# Patient Record
Sex: MALE | Race: WHITE
[De-identification: names, ages, dates, MRNs, and addresses within clinical notes are randomized per-mention and may not be internally consistent; named-entity substitution may affect disease eponyms.]

---

## 2020-05-26 ENCOUNTER — HOSPITAL ENCOUNTER (OUTPATIENT)
Dept: HOSPITAL 76 - DI | Age: 65
Discharge: HOME | End: 2020-05-26
Attending: NURSE PRACTITIONER
Payer: MEDICARE

## 2020-05-26 DIAGNOSIS — K42.9: Primary | ICD-10-CM

## 2020-05-26 PROCEDURE — 76705 ECHO EXAM OF ABDOMEN: CPT

## 2020-05-27 NOTE — ULTRASOUND REPORT
Reason:  UMBILICAL HERNIA

Procedure Date:  05/26/2020   

Accession Number:  [id number] / [id number]                    

Procedure:  US  - Abdomen Limited CPT Code:  

 

***Final Report***

 

 

FULL RESULT:

 

 

EXAM:

ABDOMEN ULTRASOUND LIMITED

 

EXAM DATE: 05/26/2020 05:46 PM.

 

CLINICAL HISTORY: Umbilical hernia.

 

COMPARISON: None available.

 

TECHNIQUE: Real-time scanning was performed with static images obtained.

 

FINDINGS:

Positive for an umbilical hernia which increases with Valsalva and does 

not completely reduce. The hernia measures up to approximately 2.9 x 1.9 

x 3 cm with neck measuring approximately 2.4 cm. The hernia contains fat, 

but no bowel is demonstrated.

IMPRESSION:

Positive for a fat-containing umbilical hernia which does not completely 

reduce.

 

[facility]

## 2021-02-18 ENCOUNTER — HOSPITAL ENCOUNTER (OUTPATIENT)
Dept: HOSPITAL 76 - LAB.S | Age: 66
Discharge: HOME | End: 2021-02-18
Attending: NURSE PRACTITIONER
Payer: MEDICARE

## 2021-02-18 DIAGNOSIS — E03.9: ICD-10-CM

## 2021-02-18 DIAGNOSIS — C61: Primary | ICD-10-CM

## 2021-02-18 LAB
ALBUMIN DIAFP-MCNC: 4.6 G/DL (ref 3.2–5.5)
ALBUMIN/GLOB SERPL: 1.3 {RATIO} (ref 1–2.2)
ALP SERPL-CCNC: 38 IU/L (ref 42–121)
ALT SERPL W P-5'-P-CCNC: 34 IU/L (ref 10–60)
ANION GAP SERPL CALCULATED.4IONS-SCNC: 9 MMOL/L (ref 6–13)
AST SERPL W P-5'-P-CCNC: 18 IU/L (ref 10–42)
BASOPHILS NFR BLD AUTO: 0.1 10^3/UL (ref 0–0.1)
BASOPHILS NFR BLD AUTO: 0.8 %
BILIRUB BLD-MCNC: 0.6 MG/DL (ref 0.2–1)
BUN SERPL-MCNC: 21 MG/DL (ref 6–20)
CALCIUM UR-MCNC: 9.5 MG/DL (ref 8.5–10.3)
CHLORIDE SERPL-SCNC: 101 MMOL/L (ref 101–111)
CO2 SERPL-SCNC: 26 MMOL/L (ref 21–32)
CREAT SERPLBLD-SCNC: 0.9 MG/DL (ref 0.6–1.2)
EOSINOPHIL # BLD AUTO: 0.4 10^3/UL (ref 0–0.7)
EOSINOPHIL NFR BLD AUTO: 4.5 %
ERYTHROCYTE [DISTWIDTH] IN BLOOD BY AUTOMATED COUNT: 13 % (ref 12–15)
GLOBULIN SER-MCNC: 3.5 G/DL (ref 2.1–4.2)
GLUCOSE SERPL-MCNC: 104 MG/DL (ref 70–100)
HGB UR QL STRIP: 14.2 G/DL (ref 14–18)
LYMPHOCYTES # SPEC AUTO: 2.2 10^3/UL (ref 1.5–3.5)
LYMPHOCYTES NFR BLD AUTO: 27.8 %
MCH RBC QN AUTO: 28.8 PG (ref 27–31)
MCHC RBC AUTO-ENTMCNC: 32.5 G/DL (ref 32–36)
MCV RBC AUTO: 88.6 FL (ref 80–94)
MONOCYTES # BLD AUTO: 0.7 10^3/UL (ref 0–1)
MONOCYTES NFR BLD AUTO: 9.4 %
NEUTROPHILS # BLD AUTO: 4.4 10^3/UL (ref 1.5–6.6)
NEUTROPHILS # SNV AUTO: 7.7 X10^3/UL (ref 4.8–10.8)
NEUTROPHILS NFR BLD AUTO: 57.1 %
PDW BLD AUTO: 8.6 FL (ref 7.4–11.4)
PLATELET # BLD: 274 10^3/UL (ref 130–450)
PROT SPEC-MCNC: 8.1 G/DL (ref 6.7–8.2)
RBC MAR: 4.93 10^6/UL (ref 4.7–6.1)

## 2021-02-18 PROCEDURE — 36415 COLL VENOUS BLD VENIPUNCTURE: CPT

## 2021-02-18 PROCEDURE — 84443 ASSAY THYROID STIM HORMONE: CPT

## 2021-02-18 PROCEDURE — 80053 COMPREHEN METABOLIC PANEL: CPT

## 2021-02-18 PROCEDURE — 85025 COMPLETE CBC W/AUTO DIFF WBC: CPT

## 2021-12-21 ENCOUNTER — HOSPITAL ENCOUNTER (OUTPATIENT)
Dept: HOSPITAL 76 - SC | Age: 66
Discharge: HOME | End: 2021-12-21
Attending: NURSE PRACTITIONER
Payer: MEDICARE

## 2021-12-21 VITALS — DIASTOLIC BLOOD PRESSURE: 96 MMHG | SYSTOLIC BLOOD PRESSURE: 130 MMHG

## 2021-12-21 DIAGNOSIS — G47.10: Primary | ICD-10-CM

## 2021-12-21 DIAGNOSIS — E66.9: ICD-10-CM

## 2021-12-21 DIAGNOSIS — R41.9: ICD-10-CM

## 2021-12-21 DIAGNOSIS — R06.83: ICD-10-CM

## 2021-12-21 DIAGNOSIS — R06.81: ICD-10-CM

## 2021-12-21 DIAGNOSIS — G47.8: ICD-10-CM

## 2021-12-21 PROCEDURE — 99203 OFFICE O/P NEW LOW 30 MIN: CPT

## 2021-12-21 PROCEDURE — 99212 OFFICE O/P EST SF 10 MIN: CPT

## 2021-12-21 NOTE — SLEEP CARE CONSULTATION
Information from patient questionnaire entered by Emil Carranza MA.





I have reviewed and concur with the information entered by Emil Carranza MA. 

This document represents the service I personally performed and the decisions 

made by me, Olive Delaney ARNP.





History of Present Illness


Service Date and Time: 12/21/2021    1443


Reason for Visit: New patient


Chief Complaint: reports: Unrefreshed sleep, Snoring, Excessive daytime 

sleepiness, Fatigue, Frequent awakenings at night, Other (update supplies)


Date of Onset: Lifetime


Usual bedtime: 9-10 PM


Time it takes to fall asleep: 5 minutes


Snores at night: Yes


Observed to quit breathing while asleep: Yes


Sleeps alone due to snoring: No


Number of times waking at night: 2-3


Reasons for waking at night: reports: Choking, Pain, Bathroom


Toss, Turn, or Twitch while sleeping: Yes


Recalls having dreams: Yes


Usually gets out of bed at: 4 AM


Feels refreshed in the morning: No


Morning headache: No


Sleepy or fatigued during the day: Yes


Ever fallen asleep while driving: No


Takes day naps: No


Dreams during day naps: No


Prior sleep studies: Yes


Year and Where: [facility] 8-10 yrs ago


Additional HPI information: 





I had the pleasure of seeing KANCHAN LAMA today regarding the possibility of him

having a sleep disorder. Patient has a history of being on a CPAP but is not 

currently able to use it. His current complaints are excessive daytime 

sleepiness, frequent night awakenings, snoring, unrefreshed sleep and fatigue. 

He states he has not been able to use his CPAP because it started making lots of

noise and his wife could not sleep with the noise, so he stopped using it. It 

had been loud before but then there was a mechanical noise that was getting 

louder. He comes in to see if he can restart the CPAP.








- Parasomnia Symptoms


Ever been unable to move upon waking from sleep: No


Walks in sleep: No


Talks in sleep: No


Ever acted out dreams in sleep: No


Ever felt weak in the knees when startled or emotional: No


Bothered by creepy, crawly, restless sensations in legs: No


Problems with memory or concentration: Yes





CPAP Compliance Data


Compliance data discussion: 





Patient has a CPAP but it no longer works. It is a ResMed S9 with a humidifier. 

He uses a Bernarda nose pillows mask. He has not been able to use his CPAP for 

about 4 years. 





Subjective


On therapy, patient: reports: sleeping better, awakening more refreshed, being 

more awake and alert during the day, more rested overall.  denies: drowsiness 

while driving


Initial [place] Sleepiness Scale score: 20





Past Medical History


Past Medical History: reports: Arthritis, Hypothyroidism, Impotence, GERD, Other

(Prostate cancer, radiation tx)





Social History


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 Patient is Single and lives in [place]. 





Have you smoked in the past 12 months: No


Alcohol use: Yes


Alcohol amount and frequency: 1-2 glasses, 2-3 times/week


Caffeine use: Yes


Caffeine amount and frequency: 1-2 cups daily





Family History


Family history of sleep disordered breathing: Yes


Family Hx Sleep Apnea: Mother: Sleep apnea - Treated





Allergies and Home Medications


Drug allergies reviewed: Yes (Penicillin)


Home medication list reviewed: Yes


Allergy and home medication list: 





Benazepril 40 mg


Synthroid 75 mcg


Amlodipine 10  mg


Simvastatin 20 mg








Review of Systems


Cardiovascular: reports: high blood pressure


Gastrointestinal: reports: heartburn


Urinary: reports: frequency, urgency, impotence


Endocrine: reports: thyroid disease, sluggishness


Musculoskeletal: reports: joint pain, neck pain, muscle pain or cramping, 

mobility problems





Physical Exam


Vital signs obtained and entered by: ROMI CARRANZA CMA MA


Blood Pressure: 130/96


Cuff size: wrist


Heart Rate: 95


O2 Saturation: 97


Height: 5 ft 10 in


Weight: 228 lb


Body Mass Index: 32.7


BMI Classification: Obese


Heart: regular rate and rhythm


Lungs: clear bilaterally





Impression and Plan





1. Suspected Obstructive Sleep Apnea-Hypopnea Syndrome, as previously diagnosed 

and as suggested by a history of loud and irregular snoring, observed cessation 

of breath while asleep, gasping or choking in sleep, frequent awakening during 

the night, unrefreshed sleep, cognitive impairment, and excessive daytime 

sleepiness. Narrow oropharynx and obesity are common predisposing factors for 

obstructive sleep apnea-hypopnea syndrome. I recommend proceeding to 

polysomnography to confirm the diagnosis and to assess severity. If the patient 

has significant sleep disordered breathing, a manual CPAP titration study will 

also be performed to find the optimal treatment pressure. I informed the patient

of what the sleep studies involve and after some discussion, obtained agreement 

to proceed. The pathophysiology of obstructive sleep apnea-hypopnea syndrome was

discussed with the patient and health risks of cardiovascular and 

cerebrovascular disease if not treated.  Risks of drowsy driving discussed in 

detail and patient advised to avoid long distance driving and to pull over at 

the first sign of drowsiness. Patient agreed to plan. 





* Schedule polysomnography


* Avoid long distance driving or driving when feeling sleepy.


* Avoid alcohol, sedative and muscle relaxant around bedtime.


* Attempt to lose weight.


* Review instructions provided by trained office staff on how to prepare for the

  sleep study.


* Return for follow-up after sleep study completed.








Counseling Topics: Weight loss health impact


Visit Type: In Office


Time Spent with Patient (minutes): 31


Provider Statement: I spent 100% of the Face to Face Visit with the patient with

greater than 50% spent counseling the patient and coordination of care.

## 2022-01-14 ENCOUNTER — HOSPITAL ENCOUNTER (OUTPATIENT)
Dept: HOSPITAL 76 - SC | Age: 67
Discharge: HOME | End: 2022-01-14
Attending: NURSE PRACTITIONER
Payer: MEDICARE

## 2022-01-14 DIAGNOSIS — G47.33: Primary | ICD-10-CM

## 2022-01-14 DIAGNOSIS — R09.02: ICD-10-CM

## 2022-01-14 PROCEDURE — 95806 SLEEP STUDY UNATT&RESP EFFT: CPT

## 2022-01-27 ENCOUNTER — HOSPITAL ENCOUNTER (OUTPATIENT)
Dept: HOSPITAL 76 - SC | Age: 67
Discharge: HOME | End: 2022-01-27
Attending: NURSE PRACTITIONER
Payer: MEDICARE

## 2022-01-27 DIAGNOSIS — R09.02: ICD-10-CM

## 2022-01-27 DIAGNOSIS — G47.33: Primary | ICD-10-CM

## 2022-01-27 NOTE — SLEEP CARE CONSULTATION
Information from patient questionnaire entered by Emil Egan MA.





I have reviewed and concur with the information entered by Emil Egan MA. 

This document represents the service I personally performed and the decisions 

made by me, Olive Delaney ARNP.





History of Present Illness


Service Date and Time: 01/27/2022    1400


Initial [place] Sleepiness Scale score: 20


Current [place] Sleepiness Scale score: 20


Additional HPI information: 





KANCHAN LAMA returns via video Telehealth visit for follow up and results of the

recently performed home sleep study.





I explained the pathophysiology behind obstructive sleep apnea. We then spent 

quite a bit of time discussing different treatment options.  For mild 

obstructive sleep apnea, surgery and oral appliance are alternatives to nasal 

CPAP therapy but in moderate or severe cases, nasal CPAP is the most effective 

and reliable treatment.  Because apnea is primarily in supine position, then 

positional management therapy could be effective. Methods discussed such as 

positioning with pillows, using a T-shirt with tennis balls in the back, and 

shown commercial products that have a pillow format on back to prevent supine 

sleep. I reviewed the impact of weight changes on sleep apnea and strongly 

recommended losing weight.  





After some discussion, the patient opted to go with the nasal CPAP therapy.  

Nasal autoCPAP set at 4-15 cmH20 will be ordered with rationale explained. If 

the mask given is uncomfortable or is difficult to keep on through the night 

even with adjustment, contact the CPAP supplier as many will replace with 

another mask style if notified before  30 days.  If snoring or perceives is not 

getting enough air or too much air from the machine, notify this office. Patient

counseled not drink alcohol less than 4 hours before bedtime as it can increase 

snoring and apnea.  Patient was cautioned about risks of drowsy driving until 

sleepiness symptoms resolve. 





Sleep Study





- Results


Type of Sleep Study: Home sleep study


Prior sleep studies: Yes


Year and Where: [facility] 8-10 yrs ago


Polysomnography/Home Sleep Study results: 





Physician Impression:


The quality of the study is good. The length of the study is adequate (> 240 

minutes). Please also see the


tabulated and graphic data.


1. Obstructive Sleep Apnea-Hypopnea (ICD-10 G47.33), mild, with an AHI of 

13.2/hr and topher SaO2 of


83%. During the study, the patient had 33 apneas (33 obstructive, 0 central, 0 

mixed) and 68


hypopneas. The longest episode lasted 86.0 seconds. The respiratory events 

occurred more frequently


during supine sleep (supine AHI was 84.6 and non-supine, 10.83).


2. Hypoxemia (ICD-10 R09.02), mild, with the lowest oxygen saturation of 83 % 

and 34.9 minutes with


SaO2 under 90%. Baseline oxygen saturation was normal (Average oxygen saturation

was 92%). 





Allergies and Home Medications


Home medication list reviewed: Yes (no changes)





Review of Systems


Review of systems same as previous: Yes (no changes)





Physical Exam


Vital signs obtained and entered by: Telehealth visit to reduce exposure during 

covid pandemic


Height: 5 ft 10 in





Impression and Plan





1. Obstructive Sleep Apnea-Hypopnea Syndrome, mild, with lowest oxygen 

saturation of 83%. Obviously this is the cause of the patients symptoms of 

unrefreshed sleep, and excessive daytime sleepiness. Positive pressure therapy 

could benefit gastric reflux. As mentioned above, the patient will be started on

nasal autoCPAP therapy with pressure set at 4-15 cmH2O. Compliance guidelines 

also reviewed. A copy of compliance guidelines will be given for reference at 

check out. Because the apnea is more severe supine, I instructed to avoid 

sleeping supine using pillow positioning until able to start CPAP use. 





2. Hypoxemia, mild, with the lowest oxygen saturation of 83 % and 34.9 minutes 

with SaO2 under 90%. His baseline oxygen saturation was normal with an average 

oxygen saturation of 92%. 





* Nasal auto CPAP therapy, pressure at 4-15 cm H2O.


* Attempt to lose weight.


* Avoid alcohol consumption near bedtime.


* Avoid supine sleep until using CPAP.


* The patient is again cautioned about driving until sleepiness completely 

  resolves.


* Return one month after CPAP obtained. I will assess response to therapy and 

  compliance at that time.





Counseling Topics: Weight loss health impact


Visit Type: Telehealth Video


Video Type: ee


Patient Location: Counts include 234 beds at the [facility] room


Location of Provider: Office


Patient agrees and consents to this telehealth visit type: Yes


Patient agrees to have their insurance billed: Yes


Time Spent with Patient (minutes): 12


Provider Statement: I spent 100% of the Telehealth Video Call with the patient 

with greater than 50% spent counseling the patient and coordination of care.